# Patient Record
Sex: MALE | Race: WHITE | NOT HISPANIC OR LATINO | URBAN - NONMETROPOLITAN AREA
[De-identification: names, ages, dates, MRNs, and addresses within clinical notes are randomized per-mention and may not be internally consistent; named-entity substitution may affect disease eponyms.]

---

## 2020-10-20 ENCOUNTER — OFFICE VISIT (OUTPATIENT)
Dept: UROLOGY | Facility: CLINIC | Age: 45
End: 2020-10-20

## 2020-10-20 VITALS — HEIGHT: 74 IN | BODY MASS INDEX: 29.9 KG/M2 | TEMPERATURE: 99.7 F | WEIGHT: 233 LBS

## 2020-10-20 DIAGNOSIS — R79.89 LOW TESTOSTERONE IN MALE: Primary | ICD-10-CM

## 2020-10-20 PROCEDURE — 85027 COMPLETE CBC AUTOMATED: CPT | Performed by: UROLOGY

## 2020-10-20 PROCEDURE — 99204 OFFICE O/P NEW MOD 45 MIN: CPT | Performed by: UROLOGY

## 2020-10-20 PROCEDURE — 84153 ASSAY OF PSA TOTAL: CPT | Performed by: UROLOGY

## 2020-10-20 RX ORDER — TESTOSTERONE CYPIONATE 200 MG/ML
INJECTION, SOLUTION INTRAMUSCULAR
Qty: 10 ML | Refills: 2 | Status: SHIPPED | OUTPATIENT
Start: 2020-10-20

## 2020-10-20 NOTE — PROGRESS NOTES
"Chief Complaint:          Chief Complaint   Patient presents with   • Low Testosterone       HPI:   45 y.o. male furred for evaluation of low testosterone.  He has a negative family history of prostate cancer.  He had left testicular torsion at the age of 18 and a right pexy he had squamous cell skin cancer in his scrotum.  He has low testosterone.  He has a positive LUCIO-androgen deficiency in the age male questionnaire  The patient was queried regarding the androgen deficiency in the age male questionnaire.  This is a validated questionnaire that was performed on a set of 314 [place] male physicians when it was positive it correlated directly with a 94% chance of low testosterone.  Patient indicates there is a decrease in libido or sex drive, a lack of energy, Decreased  strength and endurance, a decreased \"enjoyment of life\", sad and grumpy feelings with significant difficulty maintaining erections.  He is also been a recent deterioration regarding work performance.  Low TestosteroneThis pleasant male patient presents today with signs and symptoms that are consistent with low testosterone he has positive Lucio questionnaire by history this includes both the sexual and nonsexual side effects.  Sexual side effects include inability to achieve and maintain an erection, in ability to maintain his erection and decreased interest and sexual activity.  Nonsexual symptomatology includes fatigue, difficulty completing a job, tiredness.  He has a discussion of the various forms testosterone available including parenteral, topical, and the form of a patch.  We discussed the efficacy of the gels, and the injections.  As well as the cost and benefits analysis.  We discussed the the studies a talked about heart disease and its effect on prostate cancer both of which are negligible.  He gives verbal consent to proceed with treatment.  He understands the risks and benefits of length he also completed his attempts at fertility he " understands the partial effect on spermatogenesis      Past Medical History:      History reviewed. No pertinent past medical history.      Current Meds:     No current outpatient medications on file.     No current facility-administered medications for this visit.         Allergies:      Allergies   Allergen Reactions   • Amoxicillin Hives        Past Surgical History:     No past surgical history on file.      Social History:     Social History     Socioeconomic History   • Marital status: Unknown     Spouse name: Not on file   • Number of children: Not on file   • Years of education: Not on file   • Highest education level: Not on file   Tobacco Use   • Smoking status: Never Smoker   • Smokeless tobacco: Never Used   Substance and Sexual Activity   • Alcohol use: Never     Frequency: Never   • Drug use: Never   • Sexual activity: Yes       Family History:     History reviewed. No pertinent family history.    Review of Systems:     Review of Systems   Constitutional: Negative.    HENT: Negative.    Eyes: Negative.    Respiratory: Negative.    Cardiovascular: Negative.    Gastrointestinal: Negative.    Endocrine: Negative.    Musculoskeletal: Negative.    Allergic/Immunologic: Negative.    Neurological: Negative.    Hematological: Negative.    Psychiatric/Behavioral: Negative.        Physical Exam:     Physical Exam  Vitals signs and nursing note reviewed.   Constitutional:       Appearance: He is well-developed.   HENT:      Head: Normocephalic and atraumatic.   Eyes:      Conjunctiva/sclera: Conjunctivae normal.      Pupils: Pupils are equal, round, and reactive to light.   Neck:      Musculoskeletal: Normal range of motion.   Cardiovascular:      Rate and Rhythm: Normal rate and regular rhythm.      Heart sounds: Normal heart sounds.   Pulmonary:      Effort: Pulmonary effort is normal.      Breath sounds: Normal breath sounds.   Abdominal:      General: Bowel sounds are normal.      Palpations: Abdomen is soft.    Genitourinary:     Comments: Soft nontender abdomen with no organomegaly, rigidity, or tenderness.  He has normal external genitalia and uncircumcised phallus with a freely movable foreskin a normal right testis with compensatory hypertrophy and an absent left testicle testes without masses there is no inguinal hernias adenopathy or abnormalities he had good rectal tone and a large smooth firm prostate.  There is no nodularity or any suspicious rectal abnormalities    Musculoskeletal: Normal range of motion.   Skin:     General: Skin is warm and dry.   Neurological:      Mental Status: He is alert and oriented to person, place, and time.      Deep Tendon Reflexes: Reflexes are normal and symmetric.   Psychiatric:         Behavior: Behavior normal.         Thought Content: Thought content normal.         Judgment: Judgment normal.         I have reviewed the following portions of the patient's history: allergies, current medications, past family history, past medical history, past social history, past surgical history, problem list and ROS and confirm it's accurate.      Procedure:       Assessment/Plan:   Low TestosteroneThis pleasant male patient presents today with signs and symptoms that are consistent with low testosterone he has positive Lucio questionnaire by history this includes both the sexual and nonsexual side effects.  Sexual side effects include inability to achieve and maintain an erection, in ability to maintain his erection and decreased interest and sexual activity.  Nonsexual symptomatology includes fatigue, difficulty completing a job, tiredness.  He has a discussion of the various forms testosterone available including parenteral, topical, and the form of a patch.  We discussed the efficacy of the gels, and the injections.  As well as the cost and benefits analysis.  We discussed the the studies a talked about heart disease and its effect on prostate cancer both of which are negligible.  He  gives verbal consent to proceed with treatment.  He understands the risks and benefits of length he also completed his attempts at fertility he understands the partial effect on spermatogenesis  PSA testing-I am recommending a PSA blood test that stands for prostate specific antigen.  I discussed the pathophysiology of PSA testing indicating its use in the diagnosis and management of prostate cancer.  I discussed the normal range being 0-4 but more appropriately being much closer to 0-2 in a normal male.  I discussed the fact that after certain age we don't recommend PSA testing especially in view of numerous comorbidities.  That this will not be a useful test.  I discussed many of the things that can artificially raised PSA including a recent infection, urinary tract infection, recent sexual intercourse.  Where even the type of movement such as manipulation of the prostate  riding a bicycle.  After all this is taken into account when the test is reviewed  the most important use of PSA which is the velocity measurement in other words the change of PSA with time as a very important factor in the use and that we look for greater than 20% rise over a year to help us make the prediction of prostate cancer.  I also discussed that the use with prostate cancer indicating that after a radical prostatectomy the PSA should be 0 and any rise indicates an early biochemical recurrence            Patient's Body mass index is 29.92 kg/m². BMI is above normal parameters. Recommendations include: educational material.              This document has been electronically signed by HIRA HU MD October 20, 2020 10:42 EDT

## 2020-10-21 LAB
DEPRECATED RDW RBC AUTO: 41.1 FL (ref 37–54)
ERYTHROCYTE [DISTWIDTH] IN BLOOD BY AUTOMATED COUNT: 13.2 % (ref 12.3–15.4)
HCT VFR BLD AUTO: 48.9 % (ref 37.5–51)
HGB BLD-MCNC: 16.5 G/DL (ref 13–17.7)
MCH RBC QN AUTO: 29 PG (ref 26.6–33)
MCHC RBC AUTO-ENTMCNC: 33.7 G/DL (ref 31.5–35.7)
MCV RBC AUTO: 86.1 FL (ref 79–97)
PLATELET # BLD AUTO: 290 10*3/MM3 (ref 140–450)
PMV BLD AUTO: 9.6 FL (ref 6–12)
PSA SERPL-MCNC: 1 NG/ML (ref 0–4)
RBC # BLD AUTO: 5.68 10*6/MM3 (ref 4.14–5.8)
WBC # BLD AUTO: 8.48 10*3/MM3 (ref 3.4–10.8)

## 2020-10-22 PROBLEM — R79.89 LOW TESTOSTERONE IN MALE: Status: ACTIVE | Noted: 2020-10-22

## 2020-10-29 ENCOUNTER — TELEPHONE (OUTPATIENT)
Dept: UROLOGY | Facility: CLINIC | Age: 45
End: 2020-10-29